# Patient Record
Sex: FEMALE | Race: WHITE | Employment: UNEMPLOYED | URBAN - NONMETROPOLITAN AREA
[De-identification: names, ages, dates, MRNs, and addresses within clinical notes are randomized per-mention and may not be internally consistent; named-entity substitution may affect disease eponyms.]

---

## 2023-03-14 RX ORDER — MULTIVITAMIN
TABLET ORAL
COMMUNITY

## 2023-03-14 RX ORDER — LORATADINE 10 MG/1
TABLET ORAL
COMMUNITY

## 2023-03-18 ENCOUNTER — OFFICE VISIT (OUTPATIENT)
Dept: PRIMARY CARE | Facility: CLINIC | Age: 38
End: 2023-03-18
Payer: COMMERCIAL

## 2023-03-18 VITALS
OXYGEN SATURATION: 98 % | BODY MASS INDEX: 20.63 KG/M2 | WEIGHT: 128.4 LBS | HEIGHT: 66 IN | TEMPERATURE: 98.9 F | DIASTOLIC BLOOD PRESSURE: 57 MMHG | RESPIRATION RATE: 14 BRPM | HEART RATE: 70 BPM | SYSTOLIC BLOOD PRESSURE: 94 MMHG

## 2023-03-18 DIAGNOSIS — Z00.00 WELLNESS EXAMINATION: Primary | ICD-10-CM

## 2023-03-18 DIAGNOSIS — Z13.29 THYROID DISORDER SCREEN: ICD-10-CM

## 2023-03-18 DIAGNOSIS — R21 RASH: ICD-10-CM

## 2023-03-18 PROBLEM — E55.9 VITAMIN D DEFICIENCY: Status: RESOLVED | Noted: 2023-03-18 | Resolved: 2023-03-18

## 2023-03-18 PROBLEM — I73.00 RAYNAUD PHENOMENON: Status: ACTIVE | Noted: 2023-03-18

## 2023-03-18 PROBLEM — E55.9 VITAMIN D DEFICIENCY: Status: ACTIVE | Noted: 2023-03-18

## 2023-03-18 PROCEDURE — 1036F TOBACCO NON-USER: CPT | Performed by: FAMILY MEDICINE

## 2023-03-18 PROCEDURE — 99395 PREV VISIT EST AGE 18-39: CPT | Performed by: FAMILY MEDICINE

## 2023-03-18 ASSESSMENT — PATIENT HEALTH QUESTIONNAIRE - PHQ9
2. FEELING DOWN, DEPRESSED OR HOPELESS: NOT AT ALL
1. LITTLE INTEREST OR PLEASURE IN DOING THINGS: NOT AT ALL
SUM OF ALL RESPONSES TO PHQ9 QUESTIONS 1 AND 2: 0

## 2023-03-18 ASSESSMENT — PAIN SCALES - GENERAL: PAINLEVEL: 0-NO PAIN

## 2023-03-18 NOTE — PATIENT INSTRUCTIONS
Katelin, recommend using Bactroban ointment or cream to the open area on your pinky, and keep covered during the day .    I ordered routine labwork, plus a couple of auto immune labs .   Send me the paperwork for your and your 's Insurance Benefit,  I am happy to fill it out .     Have a happy, healthy year,   followup annually.   If you need anything in the meantime, please reach out.      Thank you , Dr. Wilkinson

## 2023-03-18 NOTE — PROGRESS NOTES
Subjective   Katelin Bowden is a 37 y.o. female who presents for Annual Exam (PT would like to discuss eczema-like rash on hand joints. /Pt requesting Reflex PENNY Panel).  HPI    Wellness exam. No specific concerns. No interval illness.  Kids have had some minor illness(uri, gi); pt is homemaker.  Nonsmoker, regular exercise .      Hx of Raynauds. Hx of eczema,  Occasionally gets small red bumps on fingers, at joints. Come and go, recently had one of 5th DIP joint, and has not gone away like the others.     Review of Systems   Constitutional:  Negative for chills, fever and unexpected weight change.   HENT:  Negative for congestion, dental problem, sore throat and trouble swallowing.    Respiratory:  Negative for cough, shortness of breath and wheezing.    Cardiovascular:  Negative for chest pain, palpitations and leg swelling.   Gastrointestinal:  Negative for abdominal pain, blood in stool, constipation, diarrhea, nausea and vomiting.   Skin:  see HPI  Neurological:  Negative for dizziness and headaches.       Objective   Physical Exam  Vitals and nursing note reviewed.   Constitutional:       General: She is not in acute distress.     Appearance: Normal appearance.   Cardiovascular:      Heart sounds: Normal heart sounds.   Pulmonary:      Breath sounds: Normal breath sounds.   Abdominal:      General: Bowel sounds are normal.      Palpations: Abdomen is soft.   Musculoskeletal:         General: Normal range of motion.      Cervical back: Neck supple.   Skin:     General: Skin is warm and dry.      Comments: Small erythematous papules on dorsal surface of a few finger joints ;  right 5th finger, subcut swelling, with mild induration, slight bruised appearance.    Neurological:      General: No focal deficit present.      Mental Status: She is alert and oriented to person, place, and time.     Assessment/Plan   Problem List Items Addressed This Visit       Rash    Relevant Orders    Rheumatoid Factor     Sedimentation Rate    KAM with Reflex to PENNY    Wellness examination - Primary    Relevant Orders    TSH with reflex to Free T4 if abnormal    CBC and Auto Differential    Comprehensive Metabolic Panel    Lipid Panel    Follow Up In [facility] - PCP    Thyroid disorder screen    Relevant Orders    TSH with reflex to Free T4 if abnormal     Skin irritation- localized chronic inflammation ,  check additional labs.       Linnette Wilkinson MD

## 2023-06-02 ENCOUNTER — OFFICE VISIT (OUTPATIENT)
Dept: PRIMARY CARE | Facility: CLINIC | Age: 38
End: 2023-06-02
Payer: COMMERCIAL

## 2023-06-02 ENCOUNTER — LAB (OUTPATIENT)
Dept: LAB | Facility: LAB | Age: 38
End: 2023-06-02
Payer: COMMERCIAL

## 2023-06-02 VITALS
OXYGEN SATURATION: 98 % | HEART RATE: 53 BPM | TEMPERATURE: 98.6 F | BODY MASS INDEX: 21.53 KG/M2 | DIASTOLIC BLOOD PRESSURE: 55 MMHG | WEIGHT: 133.4 LBS | SYSTOLIC BLOOD PRESSURE: 92 MMHG

## 2023-06-02 DIAGNOSIS — Z00.00 WELLNESS EXAMINATION: ICD-10-CM

## 2023-06-02 DIAGNOSIS — J02.9 PHARYNGITIS, UNSPECIFIED ETIOLOGY: Primary | ICD-10-CM

## 2023-06-02 DIAGNOSIS — L70.9 ACNE, UNSPECIFIED ACNE TYPE: ICD-10-CM

## 2023-06-02 DIAGNOSIS — R21 RASH: ICD-10-CM

## 2023-06-02 DIAGNOSIS — Z13.29 THYROID DISORDER SCREEN: ICD-10-CM

## 2023-06-02 LAB
BASOPHILS (10*3/UL) IN BLOOD BY AUTOMATED COUNT: 0.02 X10E9/L (ref 0–0.1)
BASOPHILS/100 LEUKOCYTES IN BLOOD BY AUTOMATED COUNT: 0.5 % (ref 0–2)
EOSINOPHILS (10*3/UL) IN BLOOD BY AUTOMATED COUNT: 0.06 X10E9/L (ref 0–0.7)
EOSINOPHILS/100 LEUKOCYTES IN BLOOD BY AUTOMATED COUNT: 1.5 % (ref 0–6)
ERYTHROCYTE DISTRIBUTION WIDTH (RATIO) BY AUTOMATED COUNT: 12.4 % (ref 11.5–14.5)
ERYTHROCYTE MEAN CORPUSCULAR HEMOGLOBIN CONCENTRATION (G/DL) BY AUTOMATED: 32.6 G/DL (ref 32–36)
ERYTHROCYTE MEAN CORPUSCULAR VOLUME (FL) BY AUTOMATED COUNT: 95 FL (ref 80–100)
ERYTHROCYTES (10*6/UL) IN BLOOD BY AUTOMATED COUNT: 4.21 X10E12/L (ref 4–5.2)
HEMATOCRIT (%) IN BLOOD BY AUTOMATED COUNT: 39.9 % (ref 36–46)
HEMOGLOBIN (G/DL) IN BLOOD: 13 G/DL (ref 12–16)
IMMATURE GRANULOCYTES/100 LEUKOCYTES IN BLOOD BY AUTOMATED COUNT: 0.2 % (ref 0–0.9)
LEUKOCYTES (10*3/UL) IN BLOOD BY AUTOMATED COUNT: 4.1 X10E9/L (ref 4.4–11.3)
LYMPHOCYTES (10*3/UL) IN BLOOD BY AUTOMATED COUNT: 1.35 X10E9/L (ref 1.2–4.8)
LYMPHOCYTES/100 LEUKOCYTES IN BLOOD BY AUTOMATED COUNT: 33.1 % (ref 13–44)
MONOCYTES (10*3/UL) IN BLOOD BY AUTOMATED COUNT: 0.24 X10E9/L (ref 0.1–1)
MONOCYTES/100 LEUKOCYTES IN BLOOD BY AUTOMATED COUNT: 5.9 % (ref 2–10)
NEUTROPHILS (10*3/UL) IN BLOOD BY AUTOMATED COUNT: 2.4 X10E9/L (ref 1.2–7.7)
NEUTROPHILS/100 LEUKOCYTES IN BLOOD BY AUTOMATED COUNT: 58.8 % (ref 40–80)
NRBC (PER 100 WBCS) BY AUTOMATED COUNT: 0 /100 WBC (ref 0–0)
PLATELETS (10*3/UL) IN BLOOD AUTOMATED COUNT: 171 X10E9/L (ref 150–450)
POC RAPID STREP: NEGATIVE
SEDIMENTATION RATE, ERYTHROCYTE: <1 MM/H (ref 0–20)

## 2023-06-02 PROCEDURE — 84443 ASSAY THYROID STIM HORMONE: CPT

## 2023-06-02 PROCEDURE — 36415 COLL VENOUS BLD VENIPUNCTURE: CPT

## 2023-06-02 PROCEDURE — 87880 STREP A ASSAY W/OPTIC: CPT | Performed by: FAMILY MEDICINE

## 2023-06-02 PROCEDURE — 99214 OFFICE O/P EST MOD 30 MIN: CPT | Performed by: FAMILY MEDICINE

## 2023-06-02 PROCEDURE — 86038 ANTINUCLEAR ANTIBODIES: CPT

## 2023-06-02 PROCEDURE — 80061 LIPID PANEL: CPT

## 2023-06-02 PROCEDURE — 80053 COMPREHEN METABOLIC PANEL: CPT

## 2023-06-02 PROCEDURE — 85652 RBC SED RATE AUTOMATED: CPT

## 2023-06-02 PROCEDURE — 1036F TOBACCO NON-USER: CPT | Performed by: FAMILY MEDICINE

## 2023-06-02 PROCEDURE — 85025 COMPLETE CBC W/AUTO DIFF WBC: CPT

## 2023-06-02 PROCEDURE — 86431 RHEUMATOID FACTOR QUANT: CPT

## 2023-06-02 RX ORDER — MINOCYCLINE HYDROCHLORIDE 50 MG/1
50 CAPSULE ORAL DAILY
Qty: 90 CAPSULE | Refills: 0 | Status: SHIPPED | OUTPATIENT
Start: 2023-06-02 | End: 2023-11-29

## 2023-06-02 RX ORDER — FLUTICASONE PROPIONATE 50 MCG
1 SPRAY, SUSPENSION (ML) NASAL DAILY
COMMUNITY

## 2023-06-02 ASSESSMENT — ENCOUNTER SYMPTOMS
CHILLS: 0
SINUS PRESSURE: 0
SHORTNESS OF BREATH: 0
MYALGIAS: 0
FATIGUE: 0
SINUS PAIN: 0
TROUBLE SWALLOWING: 0

## 2023-06-02 NOTE — PROGRESS NOTES
Subjective   Patient ID: Katelin Bowden is a 38 y.o. female who presents for Sore Throat (Started Saturday. ) and Fatigue.  HPI  Not feeling well for about a week .  Was slightly better, but yesterday ear felt odd.  Both children(schoolage)  had febrile illnesses, lasted a week and resolved on their own .      Review of Systems   Constitutional:  Negative for chills and fatigue.   HENT:  Negative for sinus pressure, sinus pain and trouble swallowing.    Respiratory:  Negative for shortness of breath.    Musculoskeletal:  Negative for myalgias.       Objective   BP 92/55 (BP Location: Left arm, Patient Position: Sitting, BP Cuff Size: Adult)   Pulse 53   Temp 37 °C (98.6 °F) (Temporal)   Wt 60.5 kg (133 lb 6.4 oz)   SpO2 98%   BMI 21.53 kg/m²     Physical Exam  Vitals reviewed.   HENT:      Right Ear: Tympanic membrane normal.      Left Ear: Tympanic membrane normal.      Ears:      Comments: Both TMS with altered light reflex, but not erythema      Mouth/Throat:      Pharynx: No oropharyngeal exudate or posterior oropharyngeal erythema.   Eyes:      Extraocular Movements: Extraocular movements intact.      Pupils: Pupils are equal, round, and reactive to light.   Cardiovascular:      Heart sounds: Normal heart sounds.   Pulmonary:      Breath sounds: Normal breath sounds.   Musculoskeletal:      Cervical back: No rigidity or tenderness.   Lymphadenopathy:      Cervical: Cervical adenopathy present.   Skin:     Comments: Acne w scarring , upper back   Neurological:      Mental Status: She is alert.       Assessment/Plan   Problem List Items Addressed This Visit    None  Visit Diagnoses       Pharyngitis, unspecified etiology    -  Primary    Relevant Orders    POCT rapid strep A manually resulted (Completed)    Acne, unspecified acne type        Relevant Medications    minocycline 50 mg capsule        Viral URI- sxic care; reassurance     Acne - oral med .  She has already tried topical  .  Cautioned re  sunsensitivity    ROXANNE Wilkinson MD

## 2023-06-03 LAB
ALANINE AMINOTRANSFERASE (SGPT) (U/L) IN SER/PLAS: 26 U/L (ref 7–45)
ALBUMIN (G/DL) IN SER/PLAS: 4.5 G/DL (ref 3.4–5)
ALKALINE PHOSPHATASE (U/L) IN SER/PLAS: 41 U/L (ref 33–110)
ANION GAP IN SER/PLAS: 11 MMOL/L (ref 10–20)
ASPARTATE AMINOTRANSFERASE (SGOT) (U/L) IN SER/PLAS: 22 U/L (ref 9–39)
BILIRUBIN TOTAL (MG/DL) IN SER/PLAS: 0.6 MG/DL (ref 0–1.2)
CALCIUM (MG/DL) IN SER/PLAS: 9.5 MG/DL (ref 8.6–10.6)
CARBON DIOXIDE, TOTAL (MMOL/L) IN SER/PLAS: 28 MMOL/L (ref 21–32)
CHLORIDE (MMOL/L) IN SER/PLAS: 106 MMOL/L (ref 98–107)
CHOLESTEROL (MG/DL) IN SER/PLAS: 155 MG/DL (ref 0–199)
CHOLESTEROL IN HDL (MG/DL) IN SER/PLAS: 70.8 MG/DL
CHOLESTEROL/HDL RATIO: 2.2
CREATININE (MG/DL) IN SER/PLAS: 0.83 MG/DL (ref 0.5–1.05)
GFR FEMALE: >90 ML/MIN/1.73M2
GLUCOSE (MG/DL) IN SER/PLAS: 82 MG/DL (ref 74–99)
LDL: 74 MG/DL (ref 0–99)
POTASSIUM (MMOL/L) IN SER/PLAS: 4.4 MMOL/L (ref 3.5–5.3)
PROTEIN TOTAL: 6.8 G/DL (ref 6.4–8.2)
RHEUMATOID FACTOR (IU/ML) IN SERUM OR PLASMA: <10 IU/ML (ref 0–15)
SODIUM (MMOL/L) IN SER/PLAS: 141 MMOL/L (ref 136–145)
THYROTROPIN (MIU/L) IN SER/PLAS BY DETECTION LIMIT <= 0.05 MIU/L: 1.69 MIU/L (ref 0.44–3.98)
TRIGLYCERIDE (MG/DL) IN SER/PLAS: 49 MG/DL (ref 0–149)
UREA NITROGEN (MG/DL) IN SER/PLAS: 15 MG/DL (ref 6–23)
VLDL: 10 MG/DL (ref 0–40)

## 2023-06-06 LAB — ANTI-NUCLEAR ANTIBODY (ANA): NEGATIVE

## 2023-07-06 ENCOUNTER — TELEPHONE (OUTPATIENT)
Dept: PRIMARY CARE | Facility: CLINIC | Age: 38
End: 2023-07-06
Payer: COMMERCIAL

## 2023-07-06 NOTE — TELEPHONE ENCOUNTER
----- Message from Jessica Lyles MA sent at 7/5/2023  8:22 AM EDT -----  Regarding: FW: Labwork  Contact: [phone]  Printed, in your inbox  ----- Message -----  From: Katelin Bowden  Sent: 7/5/2023   7:39 AM EDT  To:  [place] [id number] Clinical Support Staff  Subject: Labwork                                          Mi Wilkinson,    I am, thank you! Please see attached form for your completion.    Thank you,  Katelin Bowden

## 2023-07-07 ENCOUNTER — TELEPHONE (OUTPATIENT)
Dept: PRIMARY CARE | Facility: CLINIC | Age: 38
End: 2023-07-07
Payer: COMMERCIAL

## 2023-07-07 NOTE — TELEPHONE ENCOUNTER
----- Message from Dorota Lopez DO sent at 7/6/2023  6:02 AM EDT -----  Regarding: form printed  Contact: [phone]  Svn to complete  ----- Message -----  From: Jessica Lyles MA  Sent: 7/5/2023   8:22 AM EDT  To: Linnette Wilkinson MD  Subject: FW: Labwork                                      Printed, in your inbox  ----- Message -----  From: Katelin Bowden  Sent: 7/5/2023   7:39 AM EDT  To: [facility] [place] [id number] Clinical Support Staff  Subject: Labwork                                          Hello Dr. Wilkinson,    I am, thank you! Please see attached form for your completion.    Thank you,  Katelin Bowden

## 2023-07-08 ENCOUNTER — TELEPHONE (OUTPATIENT)
Dept: PRIMARY CARE | Facility: CLINIC | Age: 38
End: 2023-07-08
Payer: COMMERCIAL

## 2024-01-05 DIAGNOSIS — I47.10 PSVT (PAROXYSMAL SUPRAVENTRICULAR TACHYCARDIA) (CMS-HCC): Primary | ICD-10-CM

## 2024-01-05 RX ORDER — METOPROLOL TARTRATE 50 MG/1
TABLET ORAL
Qty: 10 TABLET | Refills: 0 | Status: SHIPPED | OUTPATIENT
Start: 2024-01-05 | End: 2024-03-20

## 2024-03-20 ENCOUNTER — OFFICE VISIT (OUTPATIENT)
Dept: PRIMARY CARE | Facility: CLINIC | Age: 39
End: 2024-03-20
Payer: COMMERCIAL

## 2024-03-20 VITALS
OXYGEN SATURATION: 98 % | HEIGHT: 65 IN | SYSTOLIC BLOOD PRESSURE: 96 MMHG | HEART RATE: 63 BPM | DIASTOLIC BLOOD PRESSURE: 62 MMHG | BODY MASS INDEX: 21.89 KG/M2 | WEIGHT: 131.4 LBS | TEMPERATURE: 98.9 F

## 2024-03-20 DIAGNOSIS — R00.0 TACHYCARDIA: ICD-10-CM

## 2024-03-20 DIAGNOSIS — Z00.00 WELLNESS EXAMINATION: Primary | ICD-10-CM

## 2024-03-20 PROCEDURE — 99395 PREV VISIT EST AGE 18-39: CPT | Performed by: FAMILY MEDICINE

## 2024-03-20 PROCEDURE — 1036F TOBACCO NON-USER: CPT | Performed by: FAMILY MEDICINE

## 2024-03-20 RX ORDER — METOPROLOL TARTRATE 25 MG/1
TABLET, FILM COATED ORAL
Qty: 60 TABLET | Refills: 0 | Status: SHIPPED | OUTPATIENT
Start: 2024-03-20

## 2024-03-20 NOTE — PROGRESS NOTES
" Subjective   Patient ID: Katelin Bowden is a 38 y.o. female who presents for Annual Exam (-Started playing sports again and not sure if she needs to see cardiology again for heart. ).  HPI  Pt here for wellness .  Last CPE 1 year ago .       Interval Health :  good     Interval Changes in PMHx. PSHx, FMHx :    Hx SVT    Attempted Ablation , unsuccessful , as a teenager     Elevated HR  during exercise . Winded. Noticed sxs recently,  joined an indoor soccer league. Her usual methods; stopping play, valsalva, did not immediately resolve things.     No sxs with moderate exercise such as skiing at high altitudes and /or hiking  .  No sxs at rest .    Thinks it is triggered by recurrent sprinting  .     Concerns/Questions:      Review of Systems   Constitutional:  Negative for fever and unexpected weight change.   HENT:  Negative for dental problem.    Eyes:  Negative for visual disturbance.   Respiratory:  Negative for cough and shortness of breath.    Cardiovascular:  Negative for chest pain and palpitations.   Genitourinary:  Negative for dysuria and hematuria.   Skin:  Negative for rash.   Neurological:  Negative for syncope, light-headedness and headaches.   Hematological:  Negative for adenopathy. Does not bruise/bleed easily.       Objective   BP 96/62 (BP Location: Left arm, Patient Position: Sitting, BP Cuff Size: Adult)   Pulse 63   Temp 37.2 °C (98.9 °F) (Temporal)   Ht 1.657 m (5' 5.25\")   Wt 59.6 kg (131 lb 6.4 oz)   SpO2 98%   BMI 21.70 kg/m²     Physical Exam  Vitals and nursing note reviewed.   Constitutional:       General: She is not in acute distress.     Appearance: Normal appearance.   Cardiovascular:      Heart sounds: Normal heart sounds.   Pulmonary:      Breath sounds: Normal breath sounds.   Abdominal:      General: Bowel sounds are normal.      Palpations: Abdomen is soft.   Musculoskeletal:         General: Normal range of motion.      Cervical back: Neck supple.   Neurological:      " General: No focal deficit present.      Mental Status: She is alert and oriented to person, place, and time.         Assessment/Plan   Problem List Items Addressed This Visit          Medium    Wellness examination - Primary    Relevant Orders    CBC and Auto Differential    Comprehensive metabolic panel    Referral to Cardiology     Other Visit Diagnoses       Tachycardia        Relevant Medications    metoprolol tartrate (Lopressor) 25 mg tablet    Other Relevant Orders    Tsh With Reflex To Free T4 If Abnormal    Referral to Cardiology             1 year followup     ROXANNE Wilkinson MD

## 2024-03-21 ASSESSMENT — ENCOUNTER SYMPTOMS
BRUISES/BLEEDS EASILY: 0
PALPITATIONS: 0
ADENOPATHY: 0
COUGH: 0
UNEXPECTED WEIGHT CHANGE: 0
SHORTNESS OF BREATH: 0
FEVER: 0
HEMATURIA: 0
DYSURIA: 0
HEADACHES: 0
LIGHT-HEADEDNESS: 0

## 2024-05-06 PROBLEM — L70.9 ACNE: Status: ACTIVE | Noted: 2023-03-18

## 2024-05-06 PROBLEM — R00.0 TACHYCARDIA: Status: ACTIVE | Noted: 2024-05-06

## 2024-05-16 ENCOUNTER — HOSPITAL ENCOUNTER (OUTPATIENT)
Dept: CARDIOLOGY | Facility: CLINIC | Age: 39
Discharge: HOME | End: 2024-05-16
Payer: COMMERCIAL

## 2024-05-16 ENCOUNTER — OFFICE VISIT (OUTPATIENT)
Dept: CARDIOLOGY | Facility: CLINIC | Age: 39
End: 2024-05-16
Payer: COMMERCIAL

## 2024-05-16 VITALS
BODY MASS INDEX: 21.8 KG/M2 | DIASTOLIC BLOOD PRESSURE: 67 MMHG | OXYGEN SATURATION: 99 % | HEART RATE: 67 BPM | WEIGHT: 132 LBS | SYSTOLIC BLOOD PRESSURE: 117 MMHG

## 2024-05-16 DIAGNOSIS — I47.10 PAROXYSMAL SVT (SUPRAVENTRICULAR TACHYCARDIA) (CMS-HCC): ICD-10-CM

## 2024-05-16 DIAGNOSIS — R00.0 TACHYCARDIA: ICD-10-CM

## 2024-05-16 DIAGNOSIS — Z00.00 WELLNESS EXAMINATION: ICD-10-CM

## 2024-05-16 DIAGNOSIS — I47.10 PAROXYSMAL SVT (SUPRAVENTRICULAR TACHYCARDIA) (CMS-HCC): Primary | ICD-10-CM

## 2024-05-16 PROCEDURE — 1036F TOBACCO NON-USER: CPT | Performed by: STUDENT IN AN ORGANIZED HEALTH CARE EDUCATION/TRAINING PROGRAM

## 2024-05-16 PROCEDURE — 99204 OFFICE O/P NEW MOD 45 MIN: CPT | Performed by: STUDENT IN AN ORGANIZED HEALTH CARE EDUCATION/TRAINING PROGRAM

## 2024-05-16 PROCEDURE — 93243 EXT ECG>48HR<7D SCAN A/R: CPT

## 2024-05-16 PROCEDURE — 93244 EXT ECG>48HR<7D REV&INTERPJ: CPT | Performed by: INTERNAL MEDICINE

## 2024-05-16 PROCEDURE — 93005 ELECTROCARDIOGRAM TRACING: CPT | Mod: 59 | Performed by: STUDENT IN AN ORGANIZED HEALTH CARE EDUCATION/TRAINING PROGRAM

## 2024-05-16 PROCEDURE — 99214 OFFICE O/P EST MOD 30 MIN: CPT | Performed by: STUDENT IN AN ORGANIZED HEALTH CARE EDUCATION/TRAINING PROGRAM

## 2024-05-16 ASSESSMENT — ENCOUNTER SYMPTOMS
NEAR-SYNCOPE: 0
EYES NEGATIVE: 1
HEMATOLOGIC/LYMPHATIC NEGATIVE: 1
ALLERGIC/IMMUNOLOGIC NEGATIVE: 1
PSYCHIATRIC NEGATIVE: 1
PALPITATIONS: 1
SYNCOPE: 0
ORTHOPNEA: 0
CONSTITUTIONAL NEGATIVE: 1
MUSCULOSKELETAL NEGATIVE: 1
GASTROINTESTINAL NEGATIVE: 1
ENDOCRINE NEGATIVE: 1
RESPIRATORY NEGATIVE: 1
PND: 0
NEUROLOGICAL NEGATIVE: 1
DYSPNEA ON EXERTION: 0

## 2024-05-16 NOTE — PROGRESS NOTES
Cardiology New Patient History and Physical    Reason for referral: tachycardia    HPI: Katelin Bowden is a 39 y.o.  female who presents today for tachycardia. Past medical history of paroxysmal SVT (Dx ~ age 17), Raynaud's phenomenon.     Patient was recently seen by her PCP for a wellness visit and noted intermittent tachycardia with exercise.  Symptoms are provoked by activity such as soccer or tennis.  Patient notes a history of sudden tachycardia with heart rates in the 130s to 160s.  Symptoms feel similar to prior episodes of SVT.  Symptoms occasionally improved with vagal maneuvers, however patient notes vagal maneuvers are not always successful.  Patient referred to cardiology for further evaluation and treatment.    Katelin presented to cardiology clinic on 5/16/2024.  Remote hx of paroxysmal SVT > diagnosed ~ age 17 (was previously on diltiazem).  Symptoms are provoked with exercise (soccer).  Katelin notes she previously underwent EP study in remote past however EP team was unable to induce arrhythmia and no ablation was performed.  More recently she has been using metoprolol tartrate 25 mg as needed for heart palpitations.  Symptoms occur near daily and are only provoked by strenuous activity. Recent TSH showed normal thyroid function.  EKG showed sinus rhythm.     Past Medical History:   - As above    Surgical History:   She has no past surgical history on file.    Family History:   Family History   Problem Relation Name Age of Onset    Breast cancer Sister       Allergies:  Codeine     Social History:   - non-smoker; no illicit drug  - her father is a family medicine physician    Prior Cardiovascular Testing (personally reviewed):     Lipid Panel (6/2/2023)-total 155, HDL 70, LDL 74, triglycerides 49 mg/dL.    Review of Systems:  Review of Systems   Constitutional: Negative.   HENT: Negative.     Eyes: Negative.    Cardiovascular:  Positive for palpitations. Negative for chest pain, dyspnea on  exertion, near-syncope, orthopnea, paroxysmal nocturnal dyspnea and syncope.   Respiratory: Negative.     Endocrine: Negative.    Hematologic/Lymphatic: Negative.    Skin: Negative.    Musculoskeletal: Negative.    Gastrointestinal: Negative.    Genitourinary: Negative.    Neurological: Negative.    Psychiatric/Behavioral: Negative.     Allergic/Immunologic: Negative.        Objective     Outpatient Medications:    Current Outpatient Medications:     fluticasone (Flonase) 50 mcg/actuation nasal spray, Administer 1 spray into each nostril once daily. Shake gently. Before first use, prime pump. After use, clean tip and replace cap., Disp: , Rfl:     loratadine (Claritin) 10 mg tablet, Take by mouth., Disp: , Rfl:     metoprolol tartrate (Lopressor) 25 mg tablet, 1 po prior to exercise, Disp: 60 tablet, Rfl: 0    multivitamin tablet, Take by mouth., Disp: , Rfl:      Last Recorded Vitals  /67 (BP Location: Right arm, Patient Position: Sitting, BP Cuff Size: Adult)   Pulse 67   Wt 59.9 kg (132 lb)   SpO2 99%   BMI 21.80 kg/m²     Physical Exam:  Physical Exam  Constitutional:       General: She is not in acute distress.  HENT:      Head: Normocephalic.      Mouth/Throat:      Mouth: Mucous membranes are moist.   Eyes:      Extraocular Movements: Extraocular movements intact.      Conjunctiva/sclera: Conjunctivae normal.   Neck:      Vascular: No carotid bruit or JVD.   Cardiovascular:      Rate and Rhythm: Normal rate and regular rhythm.      Pulses: Normal pulses.      Heart sounds: No murmur heard.  Pulmonary:      Effort: Pulmonary effort is normal. No respiratory distress.      Breath sounds: Normal breath sounds.   Abdominal:      General: Bowel sounds are normal. There is no distension.      Palpations: Abdomen is soft.   Musculoskeletal:         General: No swelling.   Skin:     General: Skin is warm and dry.   Neurological:      General: No focal deficit present.      Mental Status: She is alert.       "Cranial Nerves: No cranial nerve deficit.      Motor: No weakness.   Psychiatric:         Mood and Affect: Mood normal.         Behavior: Behavior normal.         Lab Review:    Lab Results   Component Value Date    GLUCOSE 82 06/02/2023    CALCIUM 9.5 06/02/2023     06/02/2023    K 4.4 06/02/2023    CO2 28 06/02/2023     06/02/2023    BUN 15 06/02/2023    CREATININE 0.83 06/02/2023       Lab Results   Component Value Date    WBC 4.1 (L) 06/02/2023    HGB 13.0 06/02/2023    HCT 39.9 06/02/2023    MCV 95 06/02/2023     06/02/2023       Lab Results   Component Value Date    CHOL 155 06/02/2023    CHOL 172 02/24/2022     Lab Results   Component Value Date    HDL 70.8 06/02/2023    HDL 68.6 02/24/2022     No results found for: \"LDLCALC\"  Lab Results   Component Value Date    TRIG 49 06/02/2023    TRIG 48 02/24/2022       Lab Results   Component Value Date    TSH 1.69 06/02/2023       Assessment:   39 y.o.  female who presents today for tachycardia. Past medical history of paroxysmal SVT (Dx ~ age 17), Raynaud's phenomenon.     Patient with a history of paroxysmal SVT.  More recently her symptoms have returned with strenuous activity such as soccer or tennis.  Symptoms occur multiple times per week.  Patient notes significant fatigue during episodes.  No syncope or presyncope.  No family history of sudden cardiac death.    Recommend further evaluation with a 7-day Holter monitor and referral to EP for consideration of repeat EP study and possible ablation given presumed symptomatic SVT.  Patient had poor tolerance of diltiazem and beta-blockade in the past due to generalized fatigue.    Overall Plan:  1.  Paroxysmal tachycardia; history of paroxysmal SVT  - Further investigation 70 Holter monitor  - Referral to electrophysiology for consideration of repeat EP study and ablation     2.  Discussed vagal maneuvers    3. Discussed \"red flag\" symptoms that should prompt immediate medical attention; " patient verbalized understanding    Disposition: Return to cardiology clinic after EP consultation     Yung Jeter MD

## 2024-05-16 NOTE — PATIENT INSTRUCTIONS
Thank you for your visit today. Please contact our office (via MyChart or phone) with any additional questions.     [facility]    Argenis, RN/Clinic Nurse for:    Dr. Steffany Funk    [address], [address]  [place] [address]    Phone: [phone] Press Option 5 then Option 3 to speak with the Clinic Nurse (Argenis)    _____    To Reach:    Billing Questions -    [phone]  Scheduling / Rescheduling -  Option 1  Refills / Medication Requests -  Option 3  General Office / [place] -  Option 4  Results -     Option 6  Medical Records -    Option 7  Repeat Options -    Option 9

## 2024-05-18 LAB
ATRIAL RATE: 67 BPM
P AXIS: 70 DEGREES
P OFFSET: 199 MS
P ONSET: 155 MS
PR INTERVAL: 138 MS
Q ONSET: 224 MS
QRS COUNT: 11 BEATS
QRS DURATION: 74 MS
QT INTERVAL: 402 MS
QTC CALCULATION(BAZETT): 424 MS
QTC FREDERICIA: 417 MS
R AXIS: 86 DEGREES
T AXIS: 74 DEGREES
T OFFSET: 425 MS
VENTRICULAR RATE: 67 BPM

## 2024-06-10 ENCOUNTER — OFFICE VISIT (OUTPATIENT)
Dept: CARDIOLOGY | Facility: CLINIC | Age: 39
End: 2024-06-10
Payer: COMMERCIAL

## 2024-06-10 VITALS
DIASTOLIC BLOOD PRESSURE: 86 MMHG | HEIGHT: 65 IN | BODY MASS INDEX: 22.16 KG/M2 | OXYGEN SATURATION: 99 % | WEIGHT: 133 LBS | HEART RATE: 66 BPM | SYSTOLIC BLOOD PRESSURE: 128 MMHG

## 2024-06-10 DIAGNOSIS — I47.10 PAROXYSMAL SVT (SUPRAVENTRICULAR TACHYCARDIA) (CMS-HCC): ICD-10-CM

## 2024-06-10 PROCEDURE — 1036F TOBACCO NON-USER: CPT | Performed by: INTERNAL MEDICINE

## 2024-06-10 PROCEDURE — 99214 OFFICE O/P EST MOD 30 MIN: CPT | Performed by: INTERNAL MEDICINE

## 2024-06-10 ASSESSMENT — ENCOUNTER SYMPTOMS
OCCASIONAL FEELINGS OF UNSTEADINESS: 0
LOSS OF SENSATION IN FEET: 0
DEPRESSION: 0

## 2024-06-10 NOTE — LETTER
Silke 10, 2024     Linnette Wilkinson MD  [address]  [place] [address]    Patient: Katelin Bowden   YOB: 1985   Date of Visit: 6/10/2024       Dear Dr. Linnette Wilkinson MD:    Thank you for referring Katelin Bowden to me for evaluation. Below are my notes for this consultation.  If you have questions, please do not hesitate to call me. I look forward to following your patient along with you.       Sincerely,     James C Ramicone, DO      CC: Yung Jeter MD  ______________________________________________________________________________________    Reason For Consult    Supraventricular tachycardia    History Of Present Illness    This is a 39-year-old female with a history of SVT.  She presents today for electrophysiology consultation at the request of Dr. Jeter.  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  For period of time she was on beta-blocker therapy and then had an attempted ablation procedure while in [place], but no arrhythmias were induced.  The patient reports being on a low-dose beta-blocker in the past but she felt very fatigued on the medication.  There are no records of the attempted ablation procedure available at this time.  The patient states that these episodes of SVT typically happen during periods of exercise, especially when playing soccer.  Her heart rate can be as high as 160 bpm and she has felt very uncomfortable during these episodes.  She does monitor her heart rate regularly with the Apple watch but at this time we do not have any ECG documentation of SVT.    PSHx: Denies any major surgical procedures    Social history: Non-smoker, caffeine does not aggravate palpitations    Family history: Negative for premature CAD     Review of systems  Other review of systems negative other than as outlined in HPI     Social History  She reports that she has never smoked. She has never used  "smokeless tobacco. She reports current alcohol use of about 1.0 standard drink of alcohol per week. She reports that she does not use drugs.    Family History  Family History   Problem Relation Name Age of Onset   • Breast cancer Sister          Allergies  Codeine    Medications  Current Outpatient Medications   Medication Instructions   • fluticasone (Flonase) 50 mcg/actuation nasal spray 1 spray, Each Nostril, Daily, Shake gently. Before first use, prime pump. After use, clean tip and replace cap.   • loratadine (Claritin) 10 mg tablet oral   • metoprolol tartrate (Lopressor) 25 mg tablet 1 po prior to exercise   • multivitamin tablet oral         Vitals      2/2/2022     3:19 PM 3/18/2023     8:13 AM 6/2/2023     8:30 AM 3/20/2024     3:39 PM 5/16/2024    10:15 AM 6/10/2024    11:40 AM   Vitals   Systolic 94 94 92 96 117 128   Diastolic 56 57 55 62 67 86   Heart Rate 76 70 53 63 67 66   Temp 36.8 °C (98.3 °F) 37.2 °C (98.9 °F) 37 °C (98.6 °F) 37.2 °C (98.9 °F)     Resp 16 14       Height (in) 1.689 m (5' 6.5\") 1.676 m (5' 6\")  1.657 m (5' 5.25\")  1.657 m (5' 5.25\")   Weight (lb) 131.06 128.4 133.4 131.4 132 133   BMI 20.84 kg/m2 20.72 kg/m2 21.53 kg/m2 21.7 kg/m2 21.8 kg/m2 21.96 kg/m2   BSA (m2) 1.67 m2 1.65 m2 1.68 m2 1.66 m2 1.66 m2 1.67 m2   Visit Report  Report Report Report Report Report        Physical Exam    General Appearance:  Alert, oriented, no distress  Skin:  Warm and dry  Head and Neck:  No elevation of JVP, no carotid bruits  Cardiac Exam:  Rhythm is regular, S1 and S2 are normal, no murmur S3 or S4  Lungs:  Clear to auscultation  Extremities:  no edema  Neurologic:  No focal deficits  Psychiatric:  Appropriate mood and behavior       Test Results    EKG May 16, 2024: Normal sinus rhythm, no ventricular preexcitation     Assessment/Plan  Problem List Items Addressed This Visit             ICD-10-CM    Paroxysmal SVT (supraventricular tachycardia) (CMS-[facility]) I47.10     1.  Supraventricular " tachycardia: Katelin has a history of SVT dating back to when she was participating in high school athletics.  She typically has episodes of the tachycardia while exercising.  Lately this has been inhibiting her ability to train harder at the level she would like to be.  We discussed the possibility of an electrophysiology study with catheter ablation at some point in the future.  I have asked Katelin to try to record episodes of the tachycardia with her Apple watch.  She is also going to try to obtain all of her prior records from when she was in high school, specifically any EKGs of the tachycardia or the electrophysiology study procedure report.  I will be following up with the patient as needed for episodes of tachycardia and ablation can be considered at some point in the future if there is documentation of SVT.              James C Ramicone, DO

## 2024-06-10 NOTE — ASSESSMENT & PLAN NOTE
1.  Supraventricular tachycardia: Katelin has a history of SVT dating back to when she was participating in high school athletics.  She typically has episodes of the tachycardia while exercising.  Lately this has been inhibiting her ability to train harder at the level she would like to be.  We discussed the possibility of an electrophysiology study with catheter ablation at some point in the future.  I have asked Katelin to try to record episodes of the tachycardia with her Apple watch.  She is also going to try to obtain all of her prior records from when she was in high school, specifically any EKGs of the tachycardia or the electrophysiology study procedure report.  I will be following up with the patient as needed for episodes of tachycardia and ablation can be considered at some point in the future if there is documentation of SVT.

## 2024-06-10 NOTE — PROGRESS NOTES
Reason For Consult    Supraventricular tachycardia    History Of Present Illness    This is a 39-year-old female with a history of SVT.  She presents today for electrophysiology consultation at the request of Dr. Jeter.  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  For period of time she was on beta-blocker therapy and then had an attempted ablation procedure while in [place], but no arrhythmias were induced.  The patient reports being on a low-dose beta-blocker in the past but she felt very fatigued on the medication.  There are no records of the attempted ablation procedure available at this time.  The patient states that these episodes of SVT typically happen during periods of exercise, especially when playing soccer.  Her heart rate can be as high as 160 bpm and she has felt very uncomfortable during these episodes.  She does monitor her heart rate regularly with the Apple watch but at this time we do not have any ECG documentation of SVT.    PSHx: Denies any major surgical procedures    Social history: Non-smoker, caffeine does not aggravate palpitations    Family history: Negative for premature CAD     Review of systems  Other review of systems negative other than as outlined in HPI     Social History  She reports that she has never smoked. She has never used smokeless tobacco. She reports current alcohol use of about 1.0 standard drink of alcohol per week. She reports that she does not use drugs.    Family History  Family History   Problem Relation Name Age of Onset    Breast cancer Sister          Allergies  Codeine    Medications  Current Outpatient Medications   Medication Instructions    fluticasone (Flonase) 50 mcg/actuation nasal spray 1 spray, Each Nostril, Daily, Shake gently. Before first use, prime pump. After use, clean tip and replace cap.    loratadine (Claritin) 10 mg tablet oral    metoprolol tartrate (Lopressor) 25 mg tablet 1 po  "prior to exercise    multivitamin tablet oral         Vitals      2/2/2022     3:19 PM 3/18/2023     8:13 AM 6/2/2023     8:30 AM 3/20/2024     3:39 PM 5/16/2024    10:15 AM 6/10/2024    11:40 AM   Vitals   Systolic 94 94 92 96 117 128   Diastolic 56 57 55 62 67 86   Heart Rate 76 70 53 63 67 66   Temp 36.8 °C (98.3 °F) 37.2 °C (98.9 °F) 37 °C (98.6 °F) 37.2 °C (98.9 °F)     Resp 16 14       Height (in) 1.689 m (5' 6.5\") 1.676 m (5' 6\")  1.657 m (5' 5.25\")  1.657 m (5' 5.25\")   Weight (lb) 131.06 128.4 133.4 131.4 132 133   BMI 20.84 kg/m2 20.72 kg/m2 21.53 kg/m2 21.7 kg/m2 21.8 kg/m2 21.96 kg/m2   BSA (m2) 1.67 m2 1.65 m2 1.68 m2 1.66 m2 1.66 m2 1.67 m2   Visit Report  Report Report Report Report Report        Physical Exam    General Appearance:  Alert, oriented, no distress  Skin:  Warm and dry  Head and Neck:  No elevation of JVP, no carotid bruits  Cardiac Exam:  Rhythm is regular, S1 and S2 are normal, no murmur S3 or S4  Lungs:  Clear to auscultation  Extremities:  no edema  Neurologic:  No focal deficits  Psychiatric:  Appropriate mood and behavior       Test Results    EKG May 16, 2024: Normal sinus rhythm, no ventricular preexcitation     Assessment/Plan  Problem List Items Addressed This Visit             ICD-10-CM    Paroxysmal SVT (supraventricular tachycardia) (CMS-[facility], located within [facility]) I47.10     1.  Supraventricular tachycardia: Katelin has a history of SVT dating back to when she was participating in high school athletics.  She typically has episodes of the tachycardia while exercising.  Lately this has been inhibiting her ability to train harder at the level she would like to be.  We discussed the possibility of an electrophysiology study with catheter ablation at some point in the future.  I have asked Katelin to try to record episodes of the tachycardia with her Apple watch.  She is also going to try to obtain all of her prior records from when she was in high school, specifically any EKGs of the tachycardia or the " electrophysiology study procedure report.  I will be following up with the patient as needed for episodes of tachycardia and ablation can be considered at some point in the future if there is documentation of SVT.              James C Ramicone, DO

## 2024-06-13 ENCOUNTER — APPOINTMENT (OUTPATIENT)
Dept: CARDIOLOGY | Facility: CLINIC | Age: 39
End: 2024-06-13
Payer: COMMERCIAL

## 2024-07-26 ENCOUNTER — LAB (OUTPATIENT)
Dept: LAB | Facility: LAB | Age: 39
End: 2024-07-26
Payer: COMMERCIAL

## 2024-07-26 DIAGNOSIS — Z00.00 WELLNESS EXAMINATION: ICD-10-CM

## 2024-07-26 DIAGNOSIS — R00.0 TACHYCARDIA: ICD-10-CM

## 2024-07-26 LAB
ALBUMIN SERPL BCP-MCNC: 4.5 G/DL (ref 3.4–5)
ALP SERPL-CCNC: 38 U/L (ref 33–110)
ALT SERPL W P-5'-P-CCNC: 13 U/L (ref 7–45)
ANION GAP SERPL CALC-SCNC: 14 MMOL/L (ref 10–20)
AST SERPL W P-5'-P-CCNC: 15 U/L (ref 9–39)
BASOPHILS # BLD AUTO: 0.03 X10*3/UL (ref 0–0.1)
BASOPHILS NFR BLD AUTO: 0.7 %
BILIRUB SERPL-MCNC: 0.8 MG/DL (ref 0–1.2)
BUN SERPL-MCNC: 14 MG/DL (ref 6–23)
CALCIUM SERPL-MCNC: 8.6 MG/DL (ref 8.6–10.6)
CHLORIDE SERPL-SCNC: 105 MMOL/L (ref 98–107)
CO2 SERPL-SCNC: 25 MMOL/L (ref 21–32)
CREAT SERPL-MCNC: 0.86 MG/DL (ref 0.5–1.05)
EGFRCR SERPLBLD CKD-EPI 2021: 88 ML/MIN/1.73M*2
EOSINOPHIL # BLD AUTO: 0.06 X10*3/UL (ref 0–0.7)
EOSINOPHIL NFR BLD AUTO: 1.4 %
ERYTHROCYTE [DISTWIDTH] IN BLOOD BY AUTOMATED COUNT: 12 % (ref 11.5–14.5)
GLUCOSE SERPL-MCNC: 80 MG/DL (ref 74–99)
HCT VFR BLD AUTO: 39 % (ref 36–46)
HGB BLD-MCNC: 12.6 G/DL (ref 12–16)
IMM GRANULOCYTES # BLD AUTO: 0 X10*3/UL (ref 0–0.7)
IMM GRANULOCYTES NFR BLD AUTO: 0 % (ref 0–0.9)
LYMPHOCYTES # BLD AUTO: 1.48 X10*3/UL (ref 1.2–4.8)
LYMPHOCYTES NFR BLD AUTO: 34.5 %
MCH RBC QN AUTO: 29.9 PG (ref 26–34)
MCHC RBC AUTO-ENTMCNC: 32.3 G/DL (ref 32–36)
MCV RBC AUTO: 92 FL (ref 80–100)
MONOCYTES # BLD AUTO: 0.27 X10*3/UL (ref 0.1–1)
MONOCYTES NFR BLD AUTO: 6.3 %
NEUTROPHILS # BLD AUTO: 2.45 X10*3/UL (ref 1.2–7.7)
NEUTROPHILS NFR BLD AUTO: 57.1 %
NRBC BLD-RTO: 0 /100 WBCS (ref 0–0)
PLATELET # BLD AUTO: 174 X10*3/UL (ref 150–450)
POTASSIUM SERPL-SCNC: 4.6 MMOL/L (ref 3.5–5.3)
PROT SERPL-MCNC: 6.6 G/DL (ref 6.4–8.2)
RBC # BLD AUTO: 4.22 X10*6/UL (ref 4–5.2)
SODIUM SERPL-SCNC: 139 MMOL/L (ref 136–145)
TSH SERPL-ACNC: 1.88 MIU/L (ref 0.44–3.98)
WBC # BLD AUTO: 4.3 X10*3/UL (ref 4.4–11.3)

## 2024-07-26 PROCEDURE — 80053 COMPREHEN METABOLIC PANEL: CPT

## 2024-07-26 PROCEDURE — 85025 COMPLETE CBC W/AUTO DIFF WBC: CPT

## 2024-07-26 PROCEDURE — 84443 ASSAY THYROID STIM HORMONE: CPT

## 2024-07-26 PROCEDURE — 36415 COLL VENOUS BLD VENIPUNCTURE: CPT

## 2024-07-30 ENCOUNTER — PATIENT MESSAGE (OUTPATIENT)
Dept: PRIMARY CARE | Facility: CLINIC | Age: 39
End: 2024-07-30
Payer: COMMERCIAL

## 2024-08-02 DIAGNOSIS — Z13.220 LIPID SCREENING: Primary | ICD-10-CM

## 2024-08-02 DIAGNOSIS — Z00.00 PHYSICAL EXAM, ANNUAL: Primary | ICD-10-CM

## 2024-08-09 ENCOUNTER — LAB (OUTPATIENT)
Dept: LAB | Facility: LAB | Age: 39
End: 2024-08-09
Payer: COMMERCIAL

## 2024-08-09 DIAGNOSIS — Z13.220 LIPID SCREENING: ICD-10-CM

## 2024-08-09 DIAGNOSIS — Z00.00 PHYSICAL EXAM, ANNUAL: ICD-10-CM

## 2024-08-09 PROCEDURE — 80061 LIPID PANEL: CPT

## 2024-08-09 PROCEDURE — 36415 COLL VENOUS BLD VENIPUNCTURE: CPT

## 2024-08-10 LAB
CHOLEST SERPL-MCNC: 189 MG/DL (ref 0–199)
CHOLESTEROL/HDL RATIO: 2.5
HDLC SERPL-MCNC: 75.8 MG/DL
LDLC SERPL CALC-MCNC: 102 MG/DL
NON HDL CHOLESTEROL: 113 MG/DL (ref 0–149)
TRIGL SERPL-MCNC: 54 MG/DL (ref 0–149)
VLDL: 11 MG/DL (ref 0–40)

## 2024-08-12 ENCOUNTER — TELEPHONE (OUTPATIENT)
Dept: PRIMARY CARE | Facility: CLINIC | Age: 39
End: 2024-08-12
Payer: COMMERCIAL

## 2024-08-12 NOTE — TELEPHONE ENCOUNTER
Completed pt's health screening form. In my outbox.     Pls  scan to chart [facility]   x

## 2025-02-19 ENCOUNTER — TELEPHONE (OUTPATIENT)
Age: 40
End: 2025-02-19

## 2025-02-19 DIAGNOSIS — J02.9 PHARYNGITIS, UNSPECIFIED ETIOLOGY: Primary | ICD-10-CM

## 2025-02-19 DIAGNOSIS — R21 RASH: ICD-10-CM

## 2025-02-19 RX ORDER — TRIAMCINOLONE ACETONIDE 1 MG/G
CREAM TOPICAL 3 TIMES DAILY
Qty: 453.6 G | Refills: 1 | Status: SHIPPED | OUTPATIENT
Start: 2025-02-19 | End: 2026-02-19

## 2025-02-19 RX ORDER — AMOXICILLIN 500 MG/1
500 CAPSULE ORAL EVERY 12 HOURS SCHEDULED
Qty: 20 CAPSULE | Refills: 0 | Status: SHIPPED | OUTPATIENT
Start: 2025-02-19 | End: 2025-03-08 | Stop reason: SDUPTHER

## 2025-03-20 ENCOUNTER — APPOINTMENT (OUTPATIENT)
Dept: PRIMARY CARE | Facility: CLINIC | Age: 40
End: 2025-03-20
Payer: COMMERCIAL

## 2025-03-20 VITALS
HEIGHT: 66 IN | TEMPERATURE: 98.1 F | WEIGHT: 130.5 LBS | OXYGEN SATURATION: 98 % | DIASTOLIC BLOOD PRESSURE: 69 MMHG | BODY MASS INDEX: 20.97 KG/M2 | SYSTOLIC BLOOD PRESSURE: 114 MMHG | HEART RATE: 74 BPM | RESPIRATION RATE: 14 BRPM

## 2025-03-20 DIAGNOSIS — Z00.00 PHYSICAL EXAM, ANNUAL: Primary | ICD-10-CM

## 2025-03-20 PROCEDURE — 99395 PREV VISIT EST AGE 18-39: CPT | Performed by: FAMILY MEDICINE

## 2025-03-20 PROCEDURE — 3008F BODY MASS INDEX DOCD: CPT | Performed by: FAMILY MEDICINE

## 2025-03-20 NOTE — PROGRESS NOTES
" Subjective   Patient ID: Katelin Bowden is a 39 y.o. female who presents for Annual Exam.  HPI    Pt here for wellness .  Last CPE 1 year ago .     Here with son and dtr .  (Avelina Weaver)  Son has appt with me as well .     Interval Health :  illnesses through the winter .       Interval Changes in PMHx. PSHx, FMHx : denies      Concerns/Questions:   denies    Review of Systems   Constitutional:  Negative for fever and unexpected weight change.   HENT:  Negative for dental problem.    Eyes:  Negative for visual disturbance.   Respiratory:  Negative for cough and shortness of breath.    Cardiovascular:  Negative for chest pain and palpitations.   Genitourinary:  Negative for dysuria and hematuria.   Skin:  Negative for rash.   Neurological:  Negative for syncope, light-headedness and headaches.   Hematological:  Negative for adenopathy. Does not bruise/bleed easily.     Patient Active Problem List   Diagnosis    Acne    Wellness examination    Thyroid disorder screen    Raynaud's phenomenon    Paroxysmal SVT (supraventricular tachycardia) (CMS-HCC)     Palpitations have not been significant  ,or bothersome.    Cardiology evaluation completed.  7 d holter unremarkable.       Objective   /69 (BP Location: Left arm, Patient Position: Sitting, BP Cuff Size: Adult)   Pulse 74   Temp 36.7 °C (98.1 °F) (Temporal)   Resp 14   Ht 1.676 m (5' 6\")   Wt 59.2 kg (130 lb 8 oz)   SpO2 98%   BMI 21.06 kg/m²     Physical Exam  Vitals and nursing note reviewed.   Constitutional:       General: She is not in acute distress.     Appearance: Normal appearance.   Cardiovascular:      Heart sounds: Normal heart sounds.   Pulmonary:      Breath sounds: Normal breath sounds.   Abdominal:      General: Bowel sounds are normal.      Palpations: Abdomen is soft.   Musculoskeletal:         General: Normal range of motion.      Cervical back: Neck supple.   Neurological:      General: No focal deficit present.      Mental Status: She " is alert and oriented to person, place, and time.              Assessment/Plan   Problem List Items Addressed This Visit    None  Visit Diagnoses       Physical exam, annual    -  Primary    Relevant Orders    CBC and Auto Differential    Comprehensive Metabolic Panel    Lipid Panel    TSH with reflex to Free T4 if abnormal            Wellness  - preventive care and health maintenance reviewed and discussed     Followup annual, sooner if a need arises.        ROXANNE Wilkinson MD

## 2025-03-26 ASSESSMENT — ENCOUNTER SYMPTOMS
HEADACHES: 0
COUGH: 0
HEMATURIA: 0
FEVER: 0
DYSURIA: 0
SHORTNESS OF BREATH: 0
PALPITATIONS: 0
ADENOPATHY: 0
LIGHT-HEADEDNESS: 0
UNEXPECTED WEIGHT CHANGE: 0
BRUISES/BLEEDS EASILY: 0

## 2025-08-09 LAB
ALBUMIN SERPL-MCNC: 4.7 G/DL (ref 3.6–5.1)
ALP SERPL-CCNC: 38 U/L (ref 31–125)
ALT SERPL-CCNC: 11 U/L (ref 6–29)
ANION GAP SERPL CALCULATED.4IONS-SCNC: 9 MMOL/L (CALC) (ref 7–17)
AST SERPL-CCNC: 16 U/L (ref 10–30)
BASOPHILS # BLD AUTO: 31 CELLS/UL (ref 0–200)
BASOPHILS NFR BLD AUTO: 0.8 %
BILIRUB SERPL-MCNC: 1 MG/DL (ref 0.2–1.2)
BUN SERPL-MCNC: 10 MG/DL (ref 7–25)
CALCIUM SERPL-MCNC: 9.4 MG/DL (ref 8.6–10.2)
CHLORIDE SERPL-SCNC: 103 MMOL/L (ref 98–110)
CHOLEST SERPL-MCNC: 180 MG/DL
CHOLEST/HDLC SERPL: 2.3 (CALC)
CO2 SERPL-SCNC: 26 MMOL/L (ref 20–32)
CREAT SERPL-MCNC: 0.8 MG/DL (ref 0.5–0.99)
EGFRCR SERPLBLD CKD-EPI 2021: 95 ML/MIN/1.73M2
EOSINOPHIL # BLD AUTO: 78 CELLS/UL (ref 15–500)
EOSINOPHIL NFR BLD AUTO: 2 %
ERYTHROCYTE [DISTWIDTH] IN BLOOD BY AUTOMATED COUNT: 11.7 % (ref 11–15)
GLUCOSE SERPL-MCNC: 84 MG/DL (ref 65–99)
HCT VFR BLD AUTO: 39.4 % (ref 35–45)
HDLC SERPL-MCNC: 77 MG/DL
HGB BLD-MCNC: 12.9 G/DL (ref 11.7–15.5)
LDLC SERPL CALC-MCNC: 90 MG/DL (CALC)
LYMPHOCYTES # BLD AUTO: 1053 CELLS/UL (ref 850–3900)
LYMPHOCYTES NFR BLD AUTO: 27 %
MCH RBC QN AUTO: 30.3 PG (ref 27–33)
MCHC RBC AUTO-ENTMCNC: 32.7 G/DL (ref 32–36)
MCV RBC AUTO: 92.5 FL (ref 80–100)
MONOCYTES # BLD AUTO: 328 CELLS/UL (ref 200–950)
MONOCYTES NFR BLD AUTO: 8.4 %
NEUTROPHILS # BLD AUTO: 2410 CELLS/UL (ref 1500–7800)
NEUTROPHILS NFR BLD AUTO: 61.8 %
NONHDLC SERPL-MCNC: 103 MG/DL (CALC)
PLATELET # BLD AUTO: 198 THOUSAND/UL (ref 140–400)
PMV BLD REES-ECKER: 11.7 FL (ref 7.5–12.5)
POTASSIUM SERPL-SCNC: 4.4 MMOL/L (ref 3.5–5.3)
PROT SERPL-MCNC: 6.6 G/DL (ref 6.1–8.1)
RBC # BLD AUTO: 4.26 MILLION/UL (ref 3.8–5.1)
SODIUM SERPL-SCNC: 138 MMOL/L (ref 135–146)
TRIGL SERPL-MCNC: 44 MG/DL
TSH SERPL-ACNC: 0.99 MIU/L
WBC # BLD AUTO: 3.9 THOUSAND/UL (ref 3.8–10.8)

## 2026-03-25 ENCOUNTER — APPOINTMENT (OUTPATIENT)
Dept: PRIMARY CARE | Facility: CLINIC | Age: 41
End: 2026-03-25
Payer: COMMERCIAL